# Patient Record
Sex: FEMALE | Race: OTHER | NOT HISPANIC OR LATINO
[De-identification: names, ages, dates, MRNs, and addresses within clinical notes are randomized per-mention and may not be internally consistent; named-entity substitution may affect disease eponyms.]

---

## 2021-07-09 PROBLEM — Z00.00 ENCOUNTER FOR PREVENTIVE HEALTH EXAMINATION: Status: ACTIVE | Noted: 2021-07-09

## 2021-07-12 ENCOUNTER — APPOINTMENT (OUTPATIENT)
Dept: ORTHOPEDIC SURGERY | Facility: CLINIC | Age: 61
End: 2021-07-12
Payer: COMMERCIAL

## 2021-07-12 VITALS — HEIGHT: 65 IN | BODY MASS INDEX: 30.16 KG/M2 | WEIGHT: 181 LBS

## 2021-07-12 PROCEDURE — 99204 OFFICE O/P NEW MOD 45 MIN: CPT

## 2021-07-12 PROCEDURE — 73030 X-RAY EXAM OF SHOULDER: CPT | Mod: RT

## 2021-07-12 PROCEDURE — 73562 X-RAY EXAM OF KNEE 3: CPT | Mod: 50

## 2021-07-12 NOTE — HISTORY OF PRESENT ILLNESS
[de-identified] : Location: Right shoulder\par Duration: 3 months\par Context: atraumatic\par Quality: achy, weakness\par Aggravating factors: ROM\par Associated symptoms: N/A\par Conservative treatment: rest, meloxicam\par Prior studies: N/A\par Also complaining of 2-3 years of bilateral knee pain, swelling, and stiffness that is made worse with walking and stairs

## 2021-07-12 NOTE — DISCUSSION/SUMMARY
[de-identified] : Treatment options have been discussed. At this point we'll begin some physical therapy. It sounds like she has some labile hypertension avoid anti-inflammatories. We discussed injection. Discussed surgery. Followup will be in 6 weeks. 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 Followup in 6 weeks.

## 2021-07-12 NOTE — PHYSICAL EXAM
[de-identified] : Right shoulder shows no warmth or swelling. There is tenderness over the anterolateral aspect of the acromion. There is a positive Neer impingement sign. Has a positive Bucio test. No rotator cuff weakness neurovascular exam is normal range of motion remains normal.\par \par Bilateral knee shows tenderness medially or crepitus on range of motion. There is a negative Evelin test bilaterally. No evidence of instability with either knee. Neurovascular exam is normal. [de-identified] : Right shoulder x-ray shows no evidence of acute fracture dislocation. There is some spurring underneath the acromion as well as the greater tuberosity\par \par Bilateral knee x-ray shows evidence of moderate degenerative arthritis.

## 2021-08-26 ENCOUNTER — APPOINTMENT (OUTPATIENT)
Dept: ORTHOPEDIC SURGERY | Facility: CLINIC | Age: 61
End: 2021-08-26
Payer: COMMERCIAL

## 2021-08-26 PROCEDURE — 99212 OFFICE O/P EST SF 10 MIN: CPT

## 2021-08-26 NOTE — DISCUSSION/SUMMARY
[de-identified] : At this time the patient is doing well. She is exercising on her own finishing up the physical therapy. If the symptoms recur she'll let me know followup will be as needed.

## 2021-08-26 NOTE — PHYSICAL EXAM
[de-identified] : Right shoulder shows no warmth or swelling. There is no tenderness over the anterolateral aspect of the acromion. There is a negative Neer impingement sign. Has a negatiive Bucio test. No rotator cuff weakness neurovascular exam is normal range of motion remains normal.\par \par Bilateral knee shows no tenderness medially or crepitus on range of motion. There is a negative Evelin test bilaterally. No evidence of instability with either knee. Neurovascular exam is normal.

## 2021-08-26 NOTE — HISTORY OF PRESENT ILLNESS
[de-identified] : Patient is following up on shoulder and knee pain. Patient states she has been attending physical therapy for the shoulder which has been\par going ok at the moment. She also stated she has gone back to work which has aggravated the pain further.Going up and down stairs aggravate knee pain.

## 2021-08-26 NOTE — REVIEW OF SYSTEMS
[Negative] : Heme/Lymph [Arthralgia] : arthralgia [Joint Pain] : no joint pain [Joint Stiffness] : no joint stiffness [Joint Swelling] : no joint swelling

## 2022-08-10 ENCOUNTER — APPOINTMENT (OUTPATIENT)
Dept: ORTHOPEDIC SURGERY | Facility: CLINIC | Age: 62
End: 2022-08-10

## 2022-08-10 DIAGNOSIS — M75.52 BURSITIS OF LEFT SHOULDER: ICD-10-CM

## 2022-08-10 DIAGNOSIS — M75.51 BURSITIS OF RIGHT SHOULDER: ICD-10-CM

## 2022-08-10 DIAGNOSIS — M17.0 BILATERAL PRIMARY OSTEOARTHRITIS OF KNEE: ICD-10-CM

## 2022-08-10 PROCEDURE — 99213 OFFICE O/P EST LOW 20 MIN: CPT

## 2022-08-10 NOTE — PHYSICAL EXAM
[de-identified] : Bilateral shoulder exam shows full range of motion positive impingement sign no weakness neurovascular exam is normal\par \par Bilateral knee exam shows crepitus on range of motion of both knees with joint line tenderness and patellofemoral tenderness no instability.

## 2022-08-10 NOTE — DISCUSSION/SUMMARY
[de-identified] : She is strongly thinking about knee replacement and have her come see Dr. Irving. With regard to her right shoulder she wants to hold off any treatment for now. She is going on disability in view of absence he may be some paperwork follow up will be as needed

## 2022-08-10 NOTE — HISTORY OF PRESENT ILLNESS
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

## 2022-08-20 ENCOUNTER — TRANSCRIPTION ENCOUNTER (OUTPATIENT)
Age: 62
End: 2022-08-20

## 2022-09-16 ENCOUNTER — APPOINTMENT (OUTPATIENT)
Dept: ORTHOPEDIC SURGERY | Facility: CLINIC | Age: 62
End: 2022-09-16

## 2022-09-16 VITALS — WEIGHT: 181 LBS | HEIGHT: 65 IN | BODY MASS INDEX: 30.16 KG/M2

## 2022-09-16 PROCEDURE — 99214 OFFICE O/P EST MOD 30 MIN: CPT | Mod: 25

## 2022-09-16 PROCEDURE — 73030 X-RAY EXAM OF SHOULDER: CPT | Mod: RT

## 2022-09-16 PROCEDURE — 73562 X-RAY EXAM OF KNEE 3: CPT | Mod: 50

## 2022-09-16 PROCEDURE — 20611 DRAIN/INJ JOINT/BURSA W/US: CPT | Mod: RT

## 2022-10-03 ENCOUNTER — APPOINTMENT (OUTPATIENT)
Dept: ORTHOPEDIC SURGERY | Facility: CLINIC | Age: 62
End: 2022-10-03

## 2022-10-03 PROCEDURE — 99214 OFFICE O/P EST MOD 30 MIN: CPT

## 2022-10-03 RX ORDER — BUDESONIDE AND FORMOTEROL FUMARATE DIHYDRATE 160; 4.5 UG/1; UG/1
160-4.5 AEROSOL RESPIRATORY (INHALATION)
Qty: 10 | Refills: 0 | Status: ACTIVE | COMMUNITY
Start: 2022-08-11

## 2022-10-03 NOTE — REASON FOR VISIT
[Follow-Up Visit] : a follow-up visit for [Knee Pain] : knee pain [Other: ____] : [unfilled] [FreeTextEntry2] : F/u for Rt shoulder pain and Aurelio knee pain. Needs paperwork filled out.

## 2022-10-03 NOTE — DISCUSSION/SUMMARY
[de-identified] : We talked again at length utilizing the radiographs about the patient's underlying etiology of her bilateral knee pain.  Her son is in attendance asked appropriate questions.  We talked about the potential need to consider knee replacement surgery if she does not see sustained symptomatic improvement with patient will consider this option follow-up with me in 6 weeks.\par \par Patient's current medical condition of severe osteoarthritis of both knees should be considered permanent form of disability.  She has complete loss of cartilage finds activities such as stair climbing and walking quite painful and challenging.  Gait.  Patient also with a degenerative arthritis that is generally progressive and that she may notice increasing symptoms as time progresses.\par \par Patient will follow-up in 6 weeks.

## 2022-10-03 NOTE — HISTORY OF PRESENT ILLNESS
[de-identified] : Patient returns today she was seen in September 2022.  Recently.  That point she was given cortisone injection to his shoulder.  She states her right shoulder feels significantly\par Luminal examination with findings of discomfort pleased with her improvement.  She also was seen because of bilateral knee pain patient had a diagnosis established with patellofemoral arthritis of both knees was marked for arthritis.  We talked on the last visit potential knee replacement surgery patient is here to discuss that as well as other potential options

## 2022-10-03 NOTE — PHYSICAL EXAM
[de-identified] : \par \par Right knee exam definite medial joint line tenderness range of motion 0-120with AP stress varus valgus stress soft tissue swelling is noted as well as warmth no obvious quad tone is good.\par \par Left knee has similar findings in his range of motion 0 to 125 degrees the knee is stable to AP valgus stress in both full extension and 90 degrees

## 2022-10-21 NOTE — REASON FOR VISIT
[Initial Visit] : an initial visit for [Knee Pain] : knee pain [FreeTextEntry2] : KAYCEE knee pain. Pt states she is in a lot of pain. Pt has difficulty climbing steps. Pt also states she has pain in the Rt shoulder. 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

## 2022-10-21 NOTE — HISTORY OF PRESENT ILLNESS
[de-identified] : First-time visit for this patient presenting with right shoulder pain which is fairly chronic.  She also is complaining of bilateral knee.  Patient has a known diagnosis of severe severe bilateral osteoarthritis of the knees.  Treated in the past by other physicians with injections medications.  She has a new complaint of 3-month history of right shoulder discomfort she has difficulty with overhead activities and internal rotation maneuvers such as putting on a jacket or a coat.\par \par As far as the knee is concerned patient with difficulty with daily activities such as longstanding stair climbing getting out of a seated position pain is chronic but seems to be worsening

## 2022-10-21 NOTE — PROCEDURE
[de-identified] : Patient was given a cortisone injection (Lidocaine 1% 4 cc + 10 mg of Kenalog) in the subacromial space of the right shoulder.. Injection is performed under sterile conditions with ultrasound guidance. Patient tolerated procedure well. If patient has a history of insulin- dependant diabetes they were informed of possible increase of blood glucose levels and instructed to adjust insulin accordingly.\par \par Manufacture [employer]\par Drug name  LIDOCAINE\par NDC #  8406-2096-87\par Lot #  [id number]\par Expiration date   05/2023\par \par Manufacture  [employer]\par Drug name  KENALOG\par NDC #  5161-5223-15\par Lot #  [id number]\par Expiration date  OCT 2023\par \par \par

## 2022-10-21 NOTE — DISCUSSION/SUMMARY
[Surgical risks reviewed] : Surgical risks reviewed [de-identified] : Talked at length about patient's underlying\par \par As far as his right shoulder is concerned patient has a tentative is rotator cuff tendinopathy no radiographic findings to suggest pregnancy of the shoulder.  Patient given a sterile subacromial injection today to help reduce her discomfort.  This was done via posterior approach\par \par Patient I also had a long discussion about her bilateral knee pain she is more symptomatic left.  Due to the advanced findings of the radiographs as well as findings suggesting consider knee replacement surgery reasonable risk and benefit discussed in detail as well as typical convalescence expectations and longevity of the implant.  Patient will consider this option follow-up in 3 weeks time.  At that point we can reassess her shoulder.

## 2022-10-21 NOTE — PHYSICAL EXAM
[de-identified] : Right shoulder positive terminal impingement pain with good cuff strength and tenderness with resisted forward elevation and abduction.  She is neurovascular intact distally\par \par Right knee exam definite medial joint line tenderness range of motion 0-120with AP stress varus valgus stress soft tissue swelling is noted as well as warmth no obvious quad tone is good.\par \par Left knee has similar findings in his range of motion 0 to 125 degrees the knee is stable to AP valgus stress in both full extension and 90 degrees [de-identified] : Right shoulder radiographs were ordered today.  AP and lateral were obtained showing well-preserved joint spaces no evidence of arthritis recent trauma or injury knee x-rays were ordered today.  AP standing\par And sunrise views were obtained showing severe tricompartmental arthritis in both knees with complete lateral as noted on sunrise view in the medial compartment as well as severe patellofemoral arthritis in the lateral patella facet on both knees on sunrise view

## 2023-02-13 ENCOUNTER — APPOINTMENT (OUTPATIENT)
Dept: ORTHOPEDIC SURGERY | Facility: CLINIC | Age: 63
End: 2023-02-13
Payer: COMMERCIAL

## 2023-02-13 DIAGNOSIS — M17.12 UNILATERAL PRIMARY OSTEOARTHRITIS, LEFT KNEE: ICD-10-CM

## 2023-02-13 PROCEDURE — 99215 OFFICE O/P EST HI 40 MIN: CPT

## 2023-02-13 PROCEDURE — 73562 X-RAY EXAM OF KNEE 3: CPT | Mod: 50

## 2023-02-13 NOTE — DISCUSSION/SUMMARY
[de-identified] : Patient I discussed at length the underlying etiology of her left knee pain.  She has radiographically confirmed diagnosis of severe osteoarthritis of the left knee.  We talked about her options including the need to consider knee replacement surgery due to the worsening nature of her symptoms and the advanced radiographic findings.\par \par We talked length about the reasonable risk benefits of the knee replacement surgery including potential complications.  We talked at length about typical convalescence expectations, our surgical approach, our implant selection criteria and we also talked at length about the typical reasonable expectations for implant longevity.\par \par Surgical risks reviewed. The reasonable risks and benefits of knee replacement surgery discussed in detail with the patient. Patient asked appropriate questions which were answered to their satisfaction. We talked about potential complications including complications they may require revision surgery such as component loosening failure migration wear and also potential complications of infection and stiffness.\par \par Patient will consider this option if she elects to move forward we will try to accommodate her soon as possible pending medical clearance.\par \par Today's consultation lasted 45 minutes.

## 2023-02-13 NOTE — HISTORY OF PRESENT ILLNESS
[de-identified] : Patient returns today she is here to consider surgical options for her worsening chronic left knee pain.  Patient has established diagnosis of severe osteoarthritis of the left knee.  She states she has difficulty with stair climbing it seems to be worsening over time she also has difficulty with prolonged standing.  Patient also has difficulty with getting in and out of seated position.  The pain is well localized about the anterior aspect the medial aspect of the left knee.

## 2023-02-13 NOTE — REASON FOR VISIT
[Follow-Up Visit] : a follow-up visit for [Knee Pain] : knee pain [FreeTextEntry2] : Lt knee pain. Pt wants to talk about possible knee surgery.

## 2023-02-13 NOTE — PHYSICAL EXAM
[de-identified] : Left knee has tenderness palpation at the medial joint line she also has tenderness patellofemoral compression.  Range of motion 0 to 125 degrees knee stable to stress varus valgus stress in both full extension and 90 degrees of flexion.  Quadriceps tone is good extensor mechanism is intact.  There is soft tissue swelling and warmth no effusion noted. [de-identified] : Knee radiographs were ordered today.  AP standing individual lateral sunrise views were obtained showing severe narrowing of the patellofemoral as well as medial compartment on the sunrise view as well as standing AP projection of the left knee.

## 2023-03-22 ENCOUNTER — NON-APPOINTMENT (OUTPATIENT)
Age: 63
End: 2023-03-22

## 2023-04-15 ENCOUNTER — LABORATORY RESULT (OUTPATIENT)
Age: 63
End: 2023-04-15

## 2023-04-18 ENCOUNTER — TRANSCRIPTION ENCOUNTER (OUTPATIENT)
Age: 63
End: 2023-04-18

## 2023-04-18 VITALS
HEART RATE: 90 BPM | WEIGHT: 202.83 LBS | HEIGHT: 65 IN | DIASTOLIC BLOOD PRESSURE: 87 MMHG | TEMPERATURE: 98 F | RESPIRATION RATE: 16 BRPM | OXYGEN SATURATION: 99 % | SYSTOLIC BLOOD PRESSURE: 149 MMHG

## 2023-04-18 RX ORDER — POVIDONE-IODINE 5 %
1 AEROSOL (ML) TOPICAL ONCE
Refills: 0 | Status: COMPLETED | OUTPATIENT
Start: 2023-04-19 | End: 2023-04-19

## 2023-04-18 RX ORDER — CHLORHEXIDINE GLUCONATE 213 G/1000ML
1 SOLUTION TOPICAL EVERY 12 HOURS
Refills: 0 | Status: COMPLETED | OUTPATIENT
Start: 2023-04-19 | End: 2023-04-20

## 2023-04-18 NOTE — H&P ADULT - PROBLEM SELECTOR PLAN 1
Admit to Orthopaedic Service.  Presents today for elective left TKR   Pt medically stable and cleared for procedure today by Dr. Meadows

## 2023-04-18 NOTE — ASU PREOP CHECKLIST - NS PREOP CHK MONITOR ANESTHESIA CONSENT
done Glycopyrrolate Pregnancy And Lactation Text: This medication is Pregnancy Category B and is considered safe during pregnancy. It is unknown if it is excreted breast milk.

## 2023-04-18 NOTE — H&P ADULT - NSHPLABSRESULTS_GEN_ALL_CORE
Preop CBC, BMP,  UA (trace leuks, + bacteria) - WNL per medical clearance   Cr 1.02   [ ] EKG  [ ] Coags    DOS Preop CBC, BMP,  UA (trace leuks, + bacteria) - WNL per medical clearance   Cr 1.02   Coags DOS- within normal limits   3M DOS

## 2023-04-18 NOTE — H&P ADULT - HISTORY OF PRESENT ILLNESS
63F c/o left knee pain x     Present for elective left total knee replacement  63F c/o left knee pain x greater than five years. Denies known injury. States knee pain developed gradually and has progressed. Denies previous surgeries. Notes failure of conservative management for left knee pain including oral analgesics and activity modification. Denies use of a cane/walker. Takes meloxicam without significant relief. Denies h/o blood clots, use of anticoagulants. Denies recent hospitalization/abx use.   Present for elective left total knee replacement with Dr. Brown

## 2023-04-18 NOTE — H&P ADULT - NSHPPHYSICALEXAM_GEN_ALL_CORE
MSK: + decreased ROM 2/2 pain, left knee       Remainder of exam per medical clearance note Gen: NAD  MSK: decreased ROM 2/2 pain at the left total knee   Motor: quad/TA/GS/EHL/FHl 5/5 bilateral lower extremities  Sensation: intact to light touch throughout bilateral lower extremities  Pulses: 2+ DP pulses bilaterally, extremities warm and well perfused, capillary refill brisk     Remainder of exam per medical clearance note

## 2023-04-18 NOTE — ASU PATIENT PROFILE, ADULT - HISTORY OF COVID-19 VACCINATION
Left detailed message that medication has been changed  Advised to call for any questions/concerns   Yes

## 2023-04-18 NOTE — H&P ADULT - NSICDXPASTMEDICALHX_GEN_ALL_CORE_FT
PAST MEDICAL HISTORY:  Asthma     HLD (hyperlipidemia)     HTN (hypertension)     Osteoarthritis of left knee

## 2023-04-19 ENCOUNTER — INPATIENT (INPATIENT)
Facility: HOSPITAL | Age: 63
LOS: 1 days | Discharge: HOME HEALTH | DRG: 470 | End: 2023-04-21
Attending: SPECIALIST | Admitting: SPECIALIST
Payer: COMMERCIAL

## 2023-04-19 ENCOUNTER — APPOINTMENT (OUTPATIENT)
Dept: ORTHOPEDIC SURGERY | Facility: HOSPITAL | Age: 63
End: 2023-04-19
Payer: COMMERCIAL

## 2023-04-19 DIAGNOSIS — I10 ESSENTIAL (PRIMARY) HYPERTENSION: ICD-10-CM

## 2023-04-19 DIAGNOSIS — Z98.890 OTHER SPECIFIED POSTPROCEDURAL STATES: Chronic | ICD-10-CM

## 2023-04-19 DIAGNOSIS — M17.12 UNILATERAL PRIMARY OSTEOARTHRITIS, LEFT KNEE: ICD-10-CM

## 2023-04-19 DIAGNOSIS — J45.909 UNSPECIFIED ASTHMA, UNCOMPLICATED: ICD-10-CM

## 2023-04-19 DIAGNOSIS — E78.5 HYPERLIPIDEMIA, UNSPECIFIED: ICD-10-CM

## 2023-04-19 DIAGNOSIS — Z98.891 HISTORY OF UTERINE SCAR FROM PREVIOUS SURGERY: Chronic | ICD-10-CM

## 2023-04-19 LAB
APTT BLD: 30.3 SEC — SIGNIFICANT CHANGE UP (ref 27.5–35.5)
INR BLD: 1.08 — SIGNIFICANT CHANGE UP (ref 0.88–1.16)
PROTHROM AB SERPL-ACNC: 12.9 SEC — SIGNIFICANT CHANGE UP (ref 10.5–13.4)

## 2023-04-19 PROCEDURE — 27447 TOTAL KNEE ARTHROPLASTY: CPT | Mod: AS,LT

## 2023-04-19 PROCEDURE — 27447 TOTAL KNEE ARTHROPLASTY: CPT | Mod: LT

## 2023-04-19 PROCEDURE — 73560 X-RAY EXAM OF KNEE 1 OR 2: CPT | Mod: 26,LT

## 2023-04-19 DEVICE — CELLERATE SURGICAL POWDER RX 5GM: Type: IMPLANTABLE DEVICE | Status: FUNCTIONAL

## 2023-04-19 DEVICE — BASEPLATE TIB JRNY NP SZ 2 LT: Type: IMPLANTABLE DEVICE | Status: FUNCTIONAL

## 2023-04-19 DEVICE — PIN RIMMED SPEED 45MM: Type: IMPLANTABLE DEVICE | Status: FUNCTIONAL

## 2023-04-19 DEVICE — FEM COMP JRNY II BCS BICRUCIATE LT SZ 4: Type: IMPLANTABLE DEVICE | Status: FUNCTIONAL

## 2023-04-19 DEVICE — PATELLA JOURNEY 29MM 1.5 MM: Type: IMPLANTABLE DEVICE | Status: FUNCTIONAL

## 2023-04-19 DEVICE — PIN TROCAR GEN 1/8 X 3IN: Type: IMPLANTABLE DEVICE | Status: FUNCTIONAL

## 2023-04-19 DEVICE — IMPLANTABLE DEVICE: Type: IMPLANTABLE DEVICE | Status: FUNCTIONAL

## 2023-04-19 RX ORDER — SENNA PLUS 8.6 MG/1
2 TABLET ORAL AT BEDTIME
Refills: 0 | Status: DISCONTINUED | OUTPATIENT
Start: 2023-04-19 | End: 2023-04-21

## 2023-04-19 RX ORDER — CELECOXIB 200 MG/1
200 CAPSULE ORAL EVERY 12 HOURS
Refills: 0 | Status: DISCONTINUED | OUTPATIENT
Start: 2023-04-20 | End: 2023-04-21

## 2023-04-19 RX ORDER — ACETAMINOPHEN 500 MG
650 TABLET ORAL EVERY 6 HOURS
Refills: 0 | Status: DISCONTINUED | OUTPATIENT
Start: 2023-04-19 | End: 2023-04-21

## 2023-04-19 RX ORDER — ALBUTEROL 90 UG/1
2 AEROSOL, METERED ORAL
Refills: 0 | DISCHARGE

## 2023-04-19 RX ORDER — POLYETHYLENE GLYCOL 3350 17 G/17G
17 POWDER, FOR SOLUTION ORAL AT BEDTIME
Refills: 0 | Status: DISCONTINUED | OUTPATIENT
Start: 2023-04-19 | End: 2023-04-21

## 2023-04-19 RX ORDER — METOPROLOL TARTRATE 50 MG
1 TABLET ORAL
Refills: 0 | DISCHARGE

## 2023-04-19 RX ORDER — CEFAZOLIN SODIUM 1 G
2000 VIAL (EA) INJECTION EVERY 8 HOURS
Refills: 0 | Status: COMPLETED | OUTPATIENT
Start: 2023-04-19 | End: 2023-04-20

## 2023-04-19 RX ORDER — ATORVASTATIN CALCIUM 80 MG/1
1 TABLET, FILM COATED ORAL
Refills: 0 | DISCHARGE

## 2023-04-19 RX ORDER — ASPIRIN/CALCIUM CARB/MAGNESIUM 324 MG
325 TABLET ORAL DAILY
Refills: 0 | Status: DISCONTINUED | OUTPATIENT
Start: 2023-04-19 | End: 2023-04-21

## 2023-04-19 RX ORDER — CELECOXIB 200 MG/1
400 CAPSULE ORAL ONCE
Refills: 0 | Status: COMPLETED | OUTPATIENT
Start: 2023-04-19 | End: 2023-04-19

## 2023-04-19 RX ORDER — AMLODIPINE BESYLATE 2.5 MG/1
1 TABLET ORAL
Refills: 0 | DISCHARGE

## 2023-04-19 RX ORDER — SODIUM CHLORIDE 9 MG/ML
1000 INJECTION, SOLUTION INTRAVENOUS
Refills: 0 | Status: DISCONTINUED | OUTPATIENT
Start: 2023-04-20 | End: 2023-04-21

## 2023-04-19 RX ORDER — ONDANSETRON 8 MG/1
4 TABLET, FILM COATED ORAL EVERY 6 HOURS
Refills: 0 | Status: DISCONTINUED | OUTPATIENT
Start: 2023-04-19 | End: 2023-04-21

## 2023-04-19 RX ORDER — MAGNESIUM HYDROXIDE 400 MG/1
30 TABLET, CHEWABLE ORAL DAILY
Refills: 0 | Status: DISCONTINUED | OUTPATIENT
Start: 2023-04-19 | End: 2023-04-21

## 2023-04-19 RX ORDER — HYDROMORPHONE HYDROCHLORIDE 2 MG/ML
0.5 INJECTION INTRAMUSCULAR; INTRAVENOUS; SUBCUTANEOUS ONCE
Refills: 0 | Status: COMPLETED | OUTPATIENT
Start: 2023-04-19

## 2023-04-19 RX ORDER — OXYCODONE HYDROCHLORIDE 5 MG/1
5 TABLET ORAL
Refills: 0 | Status: DISCONTINUED | OUTPATIENT
Start: 2023-04-19 | End: 2023-04-20

## 2023-04-19 RX ORDER — HYDROMORPHONE HYDROCHLORIDE 2 MG/ML
0.5 INJECTION INTRAMUSCULAR; INTRAVENOUS; SUBCUTANEOUS ONCE
Refills: 0 | Status: DISCONTINUED | OUTPATIENT
Start: 2023-04-19 | End: 2023-04-20

## 2023-04-19 RX ORDER — OXYCODONE HYDROCHLORIDE 5 MG/1
20 TABLET ORAL ONCE
Refills: 0 | Status: DISCONTINUED | OUTPATIENT
Start: 2023-04-19 | End: 2023-04-19

## 2023-04-19 RX ADMIN — ONDANSETRON 4 MILLIGRAM(S): 8 TABLET, FILM COATED ORAL at 16:45

## 2023-04-19 RX ADMIN — OXYCODONE HYDROCHLORIDE 20 MILLIGRAM(S): 5 TABLET ORAL at 09:48

## 2023-04-19 RX ADMIN — CHLORHEXIDINE GLUCONATE 1 APPLICATION(S): 213 SOLUTION TOPICAL at 08:43

## 2023-04-19 RX ADMIN — Medication 325 MILLIGRAM(S): at 21:43

## 2023-04-19 RX ADMIN — Medication 100 MILLIGRAM(S): at 18:32

## 2023-04-19 RX ADMIN — Medication 1 APPLICATION(S): at 08:43

## 2023-04-19 RX ADMIN — CELECOXIB 400 MILLIGRAM(S): 200 CAPSULE ORAL at 09:48

## 2023-04-20 LAB
ANION GAP SERPL CALC-SCNC: 12 MMOL/L — SIGNIFICANT CHANGE UP (ref 5–17)
BUN SERPL-MCNC: 11 MG/DL — SIGNIFICANT CHANGE UP (ref 7–23)
CALCIUM SERPL-MCNC: 9.4 MG/DL — SIGNIFICANT CHANGE UP (ref 8.4–10.5)
CHLORIDE SERPL-SCNC: 102 MMOL/L — SIGNIFICANT CHANGE UP (ref 96–108)
CO2 SERPL-SCNC: 23 MMOL/L — SIGNIFICANT CHANGE UP (ref 22–31)
CREAT SERPL-MCNC: 0.79 MG/DL — SIGNIFICANT CHANGE UP (ref 0.5–1.3)
EGFR: 83 ML/MIN/1.73M2 — SIGNIFICANT CHANGE UP
GLUCOSE SERPL-MCNC: 146 MG/DL — HIGH (ref 70–99)
HCT VFR BLD CALC: 36.7 % — SIGNIFICANT CHANGE UP (ref 34.5–45)
HCV AB S/CO SERPL IA: 0.11 S/CO — SIGNIFICANT CHANGE UP (ref 0–0.99)
HCV AB SERPL-IMP: SIGNIFICANT CHANGE UP
HGB BLD-MCNC: 11.4 G/DL — LOW (ref 11.5–15.5)
MCHC RBC-ENTMCNC: 24.5 PG — LOW (ref 27–34)
MCHC RBC-ENTMCNC: 31.1 GM/DL — LOW (ref 32–36)
MCV RBC AUTO: 78.8 FL — LOW (ref 80–100)
NRBC # BLD: 0 /100 WBCS — SIGNIFICANT CHANGE UP (ref 0–0)
PLATELET # BLD AUTO: 295 K/UL — SIGNIFICANT CHANGE UP (ref 150–400)
POTASSIUM SERPL-MCNC: 4.2 MMOL/L — SIGNIFICANT CHANGE UP (ref 3.5–5.3)
POTASSIUM SERPL-SCNC: 4.2 MMOL/L — SIGNIFICANT CHANGE UP (ref 3.5–5.3)
RBC # BLD: 4.66 M/UL — SIGNIFICANT CHANGE UP (ref 3.8–5.2)
RBC # FLD: 17.9 % — HIGH (ref 10.3–14.5)
SODIUM SERPL-SCNC: 137 MMOL/L — SIGNIFICANT CHANGE UP (ref 135–145)
WBC # BLD: 10.77 K/UL — HIGH (ref 3.8–10.5)
WBC # FLD AUTO: 10.77 K/UL — HIGH (ref 3.8–10.5)

## 2023-04-20 RX ORDER — VALSARTAN 80 MG/1
320 TABLET ORAL DAILY
Refills: 0 | Status: DISCONTINUED | OUTPATIENT
Start: 2023-04-20 | End: 2023-04-21

## 2023-04-20 RX ORDER — LANOLIN ALCOHOL/MO/W.PET/CERES
5 CREAM (GRAM) TOPICAL AT BEDTIME
Refills: 0 | Status: DISCONTINUED | OUTPATIENT
Start: 2023-04-20 | End: 2023-04-21

## 2023-04-20 RX ORDER — BUDESONIDE AND FORMOTEROL FUMARATE DIHYDRATE 160; 4.5 UG/1; UG/1
2 AEROSOL RESPIRATORY (INHALATION)
Refills: 0 | Status: DISCONTINUED | OUTPATIENT
Start: 2023-04-20 | End: 2023-04-21

## 2023-04-20 RX ORDER — SODIUM CHLORIDE 9 MG/ML
1000 INJECTION INTRAMUSCULAR; INTRAVENOUS; SUBCUTANEOUS ONCE
Refills: 0 | Status: COMPLETED | OUTPATIENT
Start: 2023-04-20 | End: 2023-04-20

## 2023-04-20 RX ORDER — HYDROMORPHONE HYDROCHLORIDE 2 MG/ML
0.5 INJECTION INTRAMUSCULAR; INTRAVENOUS; SUBCUTANEOUS
Refills: 0 | Status: DISCONTINUED | OUTPATIENT
Start: 2023-04-20 | End: 2023-04-21

## 2023-04-20 RX ORDER — CALCIUM CARBONATE 500(1250)
1 TABLET ORAL THREE TIMES A DAY
Refills: 0 | Status: DISCONTINUED | OUTPATIENT
Start: 2023-04-20 | End: 2023-04-21

## 2023-04-20 RX ORDER — AMLODIPINE BESYLATE 2.5 MG/1
10 TABLET ORAL DAILY
Refills: 0 | Status: DISCONTINUED | OUTPATIENT
Start: 2023-04-20 | End: 2023-04-21

## 2023-04-20 RX ORDER — OXYCODONE HYDROCHLORIDE 5 MG/1
5 TABLET ORAL EVERY 4 HOURS
Refills: 0 | Status: DISCONTINUED | OUTPATIENT
Start: 2023-04-20 | End: 2023-04-21

## 2023-04-20 RX ORDER — METOPROLOL TARTRATE 50 MG
50 TABLET ORAL DAILY
Refills: 0 | Status: DISCONTINUED | OUTPATIENT
Start: 2023-04-20 | End: 2023-04-21

## 2023-04-20 RX ADMIN — SODIUM CHLORIDE 1000 MILLILITER(S): 9 INJECTION INTRAMUSCULAR; INTRAVENOUS; SUBCUTANEOUS at 11:13

## 2023-04-20 RX ADMIN — OXYCODONE HYDROCHLORIDE 5 MILLIGRAM(S): 5 TABLET ORAL at 20:45

## 2023-04-20 RX ADMIN — Medication 650 MILLIGRAM(S): at 11:19

## 2023-04-20 RX ADMIN — Medication 100 MILLIGRAM(S): at 02:02

## 2023-04-20 RX ADMIN — Medication 650 MILLIGRAM(S): at 05:16

## 2023-04-20 RX ADMIN — Medication 650 MILLIGRAM(S): at 06:16

## 2023-04-20 RX ADMIN — CELECOXIB 200 MILLIGRAM(S): 200 CAPSULE ORAL at 05:13

## 2023-04-20 RX ADMIN — CELECOXIB 200 MILLIGRAM(S): 200 CAPSULE ORAL at 17:43

## 2023-04-20 RX ADMIN — Medication 325 MILLIGRAM(S): at 16:10

## 2023-04-20 RX ADMIN — OXYCODONE HYDROCHLORIDE 5 MILLIGRAM(S): 5 TABLET ORAL at 16:09

## 2023-04-20 RX ADMIN — Medication 650 MILLIGRAM(S): at 17:43

## 2023-04-20 RX ADMIN — OXYCODONE HYDROCHLORIDE 5 MILLIGRAM(S): 5 TABLET ORAL at 17:09

## 2023-04-20 RX ADMIN — CELECOXIB 200 MILLIGRAM(S): 200 CAPSULE ORAL at 06:16

## 2023-04-20 RX ADMIN — Medication 1 TABLET(S): at 11:19

## 2023-04-20 NOTE — PHYSICAL THERAPY INITIAL EVALUATION ADULT - ACTIVE RANGE OF MOTION EXAMINATION, REHAB EVAL
except left knee flexion 0-45 degrees/no Active ROM deficits were identified/deficits as listed below

## 2023-04-20 NOTE — PHYSICAL THERAPY INITIAL EVALUATION ADULT - PERTINENT HX OF CURRENT PROBLEM, REHAB EVAL
63F c/o left knee pain x greater than five years. Denies known injury. States knee pain developed gradually and has progressed. Denies previous surgeries. Notes failure of conservative management for left knee pain including oral analgesics and activity modification

## 2023-04-20 NOTE — OCCUPATIONAL THERAPY INITIAL EVALUATION ADULT - PERTINENT HX OF CURRENT PROBLEM, REHAB EVAL
63F c/o left knee pain x greater than five years. Denies known injury. States knee pain developed gradually and has progressed. Denies previous surgeries. Notes failure of conservative management for left knee pain including oral analgesics and activity modification. Denies use of a cane/walker. Takes meloxicam without significant relief. Denies h/o blood clots, use of anticoagulants. Denies recent hospitalization/abx use.

## 2023-04-20 NOTE — OCCUPATIONAL THERAPY INITIAL EVALUATION ADULT - MODALITIES TREATMENT COMMENTS
Of note, pt. +orthostatic after activity and seated in chair, pt. returned to bed immediately HOB mainly flat, team aware

## 2023-04-20 NOTE — OCCUPATIONAL THERAPY INITIAL EVALUATION ADULT - ADDITIONAL COMMENTS
per pt. she lives alone in an elevator accessible building, no LUIS. She was I prior in  ADLs and functional mob without AD. She has a tub/shower combo and a normal toilet. She is R handed

## 2023-04-20 NOTE — OCCUPATIONAL THERAPY INITIAL EVALUATION ADULT - GENERAL OBSERVATIONS, REHAB EVAL
RN Crystal clearing pt. for OOB. Pt. received semi-supine in bed,+cryocuff,+heplock, + L knee bandage C/D/I, +b/l compression stockings in NAD, agreeable to therapy session. RN Crystal clearing pt. for OOB. Pt. received semi-supine in bed,+cryocuff, +JUNG, +heplock, + L knee bandage C/D/I, +b/l compression stockings in NAD, agreeable to therapy session.

## 2023-04-20 NOTE — OCCUPATIONAL THERAPY INITIAL EVALUATION ADULT - DIAGNOSIS, OT EVAL
Pt. is POD#1 s/p L TKR. Upon assessment, pt. demo slight impairments in balance, postural control, activity tolerance and BUE/BLE strength.

## 2023-04-21 ENCOUNTER — TRANSCRIPTION ENCOUNTER (OUTPATIENT)
Age: 63
End: 2023-04-21

## 2023-04-21 VITALS
DIASTOLIC BLOOD PRESSURE: 70 MMHG | RESPIRATION RATE: 16 BRPM | OXYGEN SATURATION: 97 % | SYSTOLIC BLOOD PRESSURE: 121 MMHG | HEART RATE: 79 BPM | TEMPERATURE: 98 F

## 2023-04-21 LAB
ANION GAP SERPL CALC-SCNC: 12 MMOL/L — SIGNIFICANT CHANGE UP (ref 5–17)
BUN SERPL-MCNC: 15 MG/DL — SIGNIFICANT CHANGE UP (ref 7–23)
CALCIUM SERPL-MCNC: 8.9 MG/DL — SIGNIFICANT CHANGE UP (ref 8.4–10.5)
CHLORIDE SERPL-SCNC: 105 MMOL/L — SIGNIFICANT CHANGE UP (ref 96–108)
CO2 SERPL-SCNC: 22 MMOL/L — SIGNIFICANT CHANGE UP (ref 22–31)
CREAT SERPL-MCNC: 0.91 MG/DL — SIGNIFICANT CHANGE UP (ref 0.5–1.3)
EGFR: 70 ML/MIN/1.73M2 — SIGNIFICANT CHANGE UP
GLUCOSE SERPL-MCNC: 109 MG/DL — HIGH (ref 70–99)
HCT VFR BLD CALC: 30.7 % — LOW (ref 34.5–45)
HGB BLD-MCNC: 9.7 G/DL — LOW (ref 11.5–15.5)
MCHC RBC-ENTMCNC: 24.9 PG — LOW (ref 27–34)
MCHC RBC-ENTMCNC: 31.6 GM/DL — LOW (ref 32–36)
MCV RBC AUTO: 78.9 FL — LOW (ref 80–100)
NRBC # BLD: 0 /100 WBCS — SIGNIFICANT CHANGE UP (ref 0–0)
PLATELET # BLD AUTO: 250 K/UL — SIGNIFICANT CHANGE UP (ref 150–400)
POTASSIUM SERPL-MCNC: 3.9 MMOL/L — SIGNIFICANT CHANGE UP (ref 3.5–5.3)
POTASSIUM SERPL-SCNC: 3.9 MMOL/L — SIGNIFICANT CHANGE UP (ref 3.5–5.3)
RBC # BLD: 3.89 M/UL — SIGNIFICANT CHANGE UP (ref 3.8–5.2)
RBC # FLD: 18.1 % — HIGH (ref 10.3–14.5)
SODIUM SERPL-SCNC: 139 MMOL/L — SIGNIFICANT CHANGE UP (ref 135–145)
WBC # BLD: 8.12 K/UL — SIGNIFICANT CHANGE UP (ref 3.8–10.5)
WBC # FLD AUTO: 8.12 K/UL — SIGNIFICANT CHANGE UP (ref 3.8–10.5)

## 2023-04-21 PROCEDURE — C1889: CPT

## 2023-04-21 PROCEDURE — C1713: CPT

## 2023-04-21 PROCEDURE — 97116 GAIT TRAINING THERAPY: CPT

## 2023-04-21 PROCEDURE — 85730 THROMBOPLASTIN TIME PARTIAL: CPT

## 2023-04-21 PROCEDURE — 97165 OT EVAL LOW COMPLEX 30 MIN: CPT

## 2023-04-21 PROCEDURE — 85027 COMPLETE CBC AUTOMATED: CPT

## 2023-04-21 PROCEDURE — 97161 PT EVAL LOW COMPLEX 20 MIN: CPT

## 2023-04-21 PROCEDURE — 86803 HEPATITIS C AB TEST: CPT

## 2023-04-21 PROCEDURE — 36415 COLL VENOUS BLD VENIPUNCTURE: CPT

## 2023-04-21 PROCEDURE — C1776: CPT

## 2023-04-21 PROCEDURE — 85610 PROTHROMBIN TIME: CPT

## 2023-04-21 PROCEDURE — 80048 BASIC METABOLIC PNL TOTAL CA: CPT

## 2023-04-21 PROCEDURE — 73560 X-RAY EXAM OF KNEE 1 OR 2: CPT

## 2023-04-21 RX ORDER — CELECOXIB 200 MG/1
1 CAPSULE ORAL
Qty: 14 | Refills: 0
Start: 2023-04-21 | End: 2023-04-27

## 2023-04-21 RX ORDER — ASPIRIN/CALCIUM CARB/MAGNESIUM 324 MG
1 TABLET ORAL
Qty: 10 | Refills: 0
Start: 2023-04-21 | End: 2023-04-30

## 2023-04-21 RX ORDER — OXYCODONE HYDROCHLORIDE 5 MG/1
1 TABLET ORAL
Qty: 28 | Refills: 0
Start: 2023-04-21 | End: 2023-04-27

## 2023-04-21 RX ORDER — SENNA PLUS 8.6 MG/1
2 TABLET ORAL
Qty: 0 | Refills: 0 | DISCHARGE
Start: 2023-04-21

## 2023-04-21 RX ORDER — MELOXICAM 15 MG/1
1 TABLET ORAL
Refills: 0 | DISCHARGE

## 2023-04-21 RX ORDER — BUDESONIDE AND FORMOTEROL FUMARATE DIHYDRATE 160; 4.5 UG/1; UG/1
0 AEROSOL RESPIRATORY (INHALATION)
Qty: 0 | Refills: 0 | DISCHARGE
Start: 2023-04-21

## 2023-04-21 RX ORDER — PANTOPRAZOLE SODIUM 20 MG/1
1 TABLET, DELAYED RELEASE ORAL
Qty: 10 | Refills: 0
Start: 2023-04-21 | End: 2023-04-30

## 2023-04-21 RX ORDER — BUDESONIDE AND FORMOTEROL FUMARATE DIHYDRATE 160; 4.5 UG/1; UG/1
2 AEROSOL RESPIRATORY (INHALATION)
Refills: 0 | DISCHARGE

## 2023-04-21 RX ORDER — ACETAMINOPHEN 500 MG
2 TABLET ORAL
Qty: 0 | Refills: 0 | DISCHARGE
Start: 2023-04-21

## 2023-04-21 RX ADMIN — Medication 650 MILLIGRAM(S): at 05:39

## 2023-04-21 RX ADMIN — Medication 650 MILLIGRAM(S): at 00:30

## 2023-04-21 RX ADMIN — CELECOXIB 200 MILLIGRAM(S): 200 CAPSULE ORAL at 17:14

## 2023-04-21 RX ADMIN — AMLODIPINE BESYLATE 10 MILLIGRAM(S): 2.5 TABLET ORAL at 05:39

## 2023-04-21 RX ADMIN — Medication 650 MILLIGRAM(S): at 17:14

## 2023-04-21 RX ADMIN — OXYCODONE HYDROCHLORIDE 5 MILLIGRAM(S): 5 TABLET ORAL at 11:53

## 2023-04-21 RX ADMIN — OXYCODONE HYDROCHLORIDE 5 MILLIGRAM(S): 5 TABLET ORAL at 12:53

## 2023-04-21 RX ADMIN — Medication 325 MILLIGRAM(S): at 15:35

## 2023-04-21 RX ADMIN — CELECOXIB 200 MILLIGRAM(S): 200 CAPSULE ORAL at 05:39

## 2023-04-21 RX ADMIN — Medication 650 MILLIGRAM(S): at 11:52

## 2023-04-21 RX ADMIN — Medication 1 TABLET(S): at 11:52

## 2023-04-21 RX ADMIN — Medication 50 MILLIGRAM(S): at 05:40

## 2023-04-21 NOTE — DISCHARGE NOTE PROVIDER - HOSPITAL COURSE
Admit to Orthopaedics on 4/19/23 for left total knee replacement   Perioperative Antibiotics  DVT prophylaxis  Physical Therapy  Pain Management

## 2023-04-21 NOTE — DISCHARGE NOTE PROVIDER - NSDCCPTREATMENT_GEN_ALL_CORE_FT
PRINCIPAL PROCEDURE  Procedure: Left total knee arthroplasty  Findings and Treatment:      PRINCIPAL PROCEDURE  Procedure: Left total knee arthroplasty  Findings and Treatment: osteoarthritis of the left knee

## 2023-04-21 NOTE — DISCHARGE NOTE NURSING/CASE MANAGEMENT/SOCIAL WORK - PATIENT PORTAL LINK FT
You can access the FollowMyHealth Patient Portal offered by [facility] by registering at the following website: [email or web address]. By joining [email or web address]’s FollowMyHealth portal, you will also be able to view your health information using other applications (apps) compatible with our system.

## 2023-04-21 NOTE — DISCHARGE NOTE PROVIDER - CARE PROVIDER_API CALL
Jorge Brown)  Orthopaedic Surgery  [address], [address] [place] [address]  Phone: [phone]  Fax: [phone]  Follow Up Time:

## 2023-04-21 NOTE — DISCHARGE NOTE PROVIDER - NSDCFUADDINST_GEN_ALL_CORE_FT
ACTIVITY:   - Weight bear as tolerated with assistive device. No strenuous activity, heavy lifting, driving or returning to work until cleared by MD.   - Apply a cold compress to the surgical site several times daily to reduce pain and swelling. For icing, twenty-minute sessions followed by an hour off is recommended. You should ice as frequently as possible. Ice should NEVER be placed directly on the skin. Wearing compression stockings during the first week after surgery can help reduce swelling in your knee, calf and foot, but is not required.      (KNEE REPLACEMENTS)   DO place a pillow under your heel when elevating the leg, to encourage full extension of the knee. Do NOT place a pillow behind your knee for comfort, as this can lead to permanent difficulty straightening your leg. It is normal to develop some swelling in the leg, ankle, and foot because of gravity.     (JUNG – incisional wound vac)   - You have an incisional wound vac dressing with tubing to attached canister/battery pack. You may shower but must keep battery pack dry at all times. The battery dies in 7 days then can remove dressing and leave open to air. Keep your incision clean and dry. Do not pick at your incision. Do not apply creams, ointments or oils to your incision until cleared by your surgeon. Do not soak your incision in sitting water (ie tubs, pools, lakes, etc.) until cleared by your surgeon. Do not scrub the incision – instead, allow soap and water to flow over the incision and then pat it dry with a clean towel.     MEDICATION/ANTICOAGULATION:   -You have been prescribed Aspirin as a preventative to help prevent postoperative blood clots. Please take this medication as prescribed.    - You have been prescribed medications for pain:     - Tylenol for mild to moderate pain. Do not exceed 3,000mg daily.     - For more severe pain, take Tylenol with the addition of narcotic pain medication. Take this medication as prescribed. This medication may cause drowsiness or dizziness. Do not operate machinery. This medication may cause constipation.   - For any additional medications, follow instructions on the bottle.    -Try to have regular bowel movements. Take stool softener or laxative if necessary. You may wish to take Miralax daily until you have regular bowel movements.    - If you have been prescribed Aspirin or an anti-inflammatory, please take prilosec (omeprazole) once a day, before breakfast, until no longer taking Aspirin or anti-inflammatory. This will help protect your stomach.   - If you have a pain management physician, please follow-up with them postoperatively.    - If you experience any negative side effects of your medications, please call your surgeon's office to discuss.      FOLLOW-UP:   - Call to schedule an appt with Dr. Brown for follow up.   - Please follow-up with your primary care physician or any other specialist you see postoperatively, if needed.    - Contact your doctor or go to the emergency room if you experience: fever greater than 101.5, chills, chest pain, difficulty breathing, redness or excessive drainage around the incision, other concerns.

## 2023-04-21 NOTE — PROGRESS NOTE ADULT - SUBJECTIVE AND OBJECTIVE BOX
Ortho Note    Subjective:  Pt reporting Right knee stiffness and soreness, pain managed with current pain medication regimen.  Denies CP, SOB, N/V, numbness/tingling   Reviewed plan of care with patient at bedside    Vital Signs Last 24 Hrs  T(C): 36.9 (04-20-23 @ 08:31), Max: 36.9 (04-20-23 @ 08:31)  T(F): 98.5 (04-20-23 @ 08:31), Max: 98.5 (04-20-23 @ 08:31)  HR: 93 (04-20-23 @ 08:31) (93 - 94)  BP: 134/86 (04-20-23 @ 08:31) (134/86 - 144/87)  BP(mean): 102 (04-20-23 @ 08:31) (102 - 102)  RR: 16 (04-20-23 @ 08:31) (16 - 20)  SpO2: 98% (04-20-23 @ 08:31) (98% - 99%)  AVSS    Objective:    Physical Exam:  General: Pt Alert and oriented, NAD  Right knee Agnes DSG C/D/I  Pulses: +2 pedal pulses, wwp toes  Sensation: silt intact bilateral lower extremities  Motor: EHL/FHL/TA/GS- 5/5 bilateral lower extremities        Plan of Care:  A/P: 63yFemale s/p R TKR 4-19  - afebrile  - Pain Control- celebrex 200mg PO Q12h, tylenol 650mg PO Q6h, Oxycodone 5mg PO Q4h prn severe pain, dilaudid 0.5mg Q2h IVP prn breakthrough pain,  - DVT ppx: aspirin 325mg PO Daily  - PT, WBS: WBAT  - ambulate with pt as tolerated  - bowel regimen, IS use, PPI  - DIspo- home pending pt clearance    Ortho Pager [phone]
Ortho Note    Subjective:  Pt reporting Right knee stiffness and soreness, pain managed with current pain medication regimen.  Denies CP, SOB, N/V, numbness/tingling   Reviewed plan of care with patient at bedside    Vital Signs Last 24 Hrs  T(C): 36.7 (04-21-23 @ 08:41), Max: 36.7 (04-21-23 @ 05:20)  T(F): 98 (04-21-23 @ 08:41), Max: 98.1 (04-21-23 @ 05:20)  HR: 79 (04-21-23 @ 08:41) (79 - 80)  BP: 121/70 (04-21-23 @ 08:41) (110/66 - 121/70)  BP(mean): 87 (04-21-23 @ 08:41) (81 - 87)  RR: 16 (04-21-23 @ 08:41) (16 - 18)  SpO2: 97% (04-21-23 @ 08:41) (97% - 97%)  AVSS    Objective:    Physical Exam:  General: Pt Alert and oriented, NAD  Right knee Agnes DSG C/D/I  Pulses: +2 pedal pulses, wwp toes  Sensation: silt intact bilateral lower extremities  Motor: EHL/FHL/TA/GS- 5/5 bilateral lower extremities        Plan of Care:  A/P: 63yFemale s/p R TKR 4-19  - afebrile  - Pain Control- celebrex 200mg PO Q12h, tylenol 650mg PO Q6h, Oxycodone 5mg PO Q4h prn severe pain, dilaudid 0.5mg Q2h IVP prn breakthrough pain,  - DVT ppx: aspirin 325mg PO Daily  - PT, WBS: WBAT  - ambulate with pt as tolerated  - bowel regimen, IS use, PPI  - DIspo- home pending pt clearance    Ortho Pager [phone]
Orthopaedic Surgery Post Operative Check    Procedure: left total knee replacement   Surgeon: Dr. Brown     Pt comfortable without complaints, pain controlled  Denies CP, SOB, N/V, numbness/tingling    Vital Signs Last 24 Hrs  T(C): 36.3 (04-19-23 @ 14:26), Max: 36.8 (04-19-23 @ 09:14)  T(F): 97.3 (04-19-23 @ 14:26), Max: 97.3 (04-19-23 @ 13:26)  HR: 68 (04-19-23 @ 14:26) (64 - 90)  BP: 133/73 (04-19-23 @ 14:26) (107/69 - 149/87)  BP(mean): 97 (04-19-23 @ 14:26) (83 - 97)  RR: 10 (04-19-23 @ 14:26) (10 - 17)  SpO2: 96% (04-19-23 @ 14:26) (94% - 99%)  AVSS    General: Pt Alert and oriented, NAD  DSG C/D/I left knee daquan   Pulses: DP pulse palpable left lower extremity   Sensation: intact to bilateral lower extremities   Motor: EHL/FHL/TA/GS 5/5 bilateral lower extremities     Post-op X-Ray: right knee prosthesis in place     A/P: 63yFemale POD#0 s/p  right total knee replacement   - Stable  - Pain Control  - DVT ppx: ASA  - Post op abx: ancef   - PT, WBS: wbat   - f/u AM labs     Ortho Pager [phone]

## 2023-04-21 NOTE — DISCHARGE NOTE PROVIDER - NSDCCPCAREPLAN_GEN_ALL_CORE_FT
PRINCIPAL DISCHARGE DIAGNOSIS  Diagnosis: Osteoarthritis of left knee  Assessment and Plan of Treatment:      PRINCIPAL DISCHARGE DIAGNOSIS  Diagnosis: Osteoarthritis of left knee  Assessment and Plan of Treatment: s/p left TKR

## 2023-04-21 NOTE — DISCHARGE NOTE PROVIDER - CARE PROVIDERS DIRECT ADDRESSES
[email or web address]

## 2023-04-21 NOTE — DISCHARGE NOTE PROVIDER - NSDCMRMEDTOKEN_GEN_ALL_CORE_FT
Albuterol (Eqv-ProAir HFA) 90 mcg/inh inhalation aerosol: 2 inhaled as needed for  bronchospasm  amLODIPine 10 mg oral tablet: 1 orally once a day  atorvastatin 20 mg oral tablet: 1 orally once a day  meloxicam 15 mg oral tablet: 1 orally once a day  metoprolol succinate 50 mg oral tablet, extended release: 1 orally once a day  Symbicort 160 mcg-4.5 mcg/inh inhalation aerosol: 2 inhaled as needed for  bronchospasm  valsartan-hydrochlorothiazide 320 mg-25 mg oral tablet: 1 orally once a day   acetaminophen 325 mg oral tablet: 2 tab(s) orally every 6 hours  Albuterol (Eqv-ProAir HFA) 90 mcg/inh inhalation aerosol: 2 inhaled as needed for  bronchospasm  amLODIPine 10 mg oral tablet: 1 orally once a day  aspirin 325 mg oral tablet: 1 tab(s) orally once a day  atorvastatin 20 mg oral tablet: 1 orally once a day  budesonide-formoterol 160 mcg-4.5 mcg/inh inhalation aerosol: inhaled once a day  celecoxib 200 mg oral capsule: 1 cap(s) orally every 12 hours  metoprolol succinate 50 mg oral tablet, extended release: 1 orally once a day  Multiple Vitamins oral tablet: 1 tab(s) orally once a day  oxyCODONE 5 mg oral tablet: 1 tab(s) orally every 6 hours as needed for Severe Pain (7 - 10) MDD: 4  pantoprazole 40 mg oral delayed release tablet: 1 tab(s) orally once a day  senna leaf extract oral tablet: 2 tab(s) orally once a day (at bedtime)  valsartan-hydrochlorothiazide 320 mg-25 mg oral tablet: 1 orally once a day

## 2023-04-26 DIAGNOSIS — M25.762 OSTEOPHYTE, LEFT KNEE: ICD-10-CM

## 2023-04-26 DIAGNOSIS — J45.909 UNSPECIFIED ASTHMA, UNCOMPLICATED: ICD-10-CM

## 2023-04-26 DIAGNOSIS — Z79.899 OTHER LONG TERM (CURRENT) DRUG THERAPY: ICD-10-CM

## 2023-04-26 DIAGNOSIS — G47.00 INSOMNIA, UNSPECIFIED: ICD-10-CM

## 2023-04-26 DIAGNOSIS — Z79.1 LONG TERM (CURRENT) USE OF NON-STEROIDAL ANTI-INFLAMMATORIES (NSAID): ICD-10-CM

## 2023-04-26 DIAGNOSIS — M17.12 UNILATERAL PRIMARY OSTEOARTHRITIS, LEFT KNEE: ICD-10-CM

## 2023-04-26 DIAGNOSIS — I10 ESSENTIAL (PRIMARY) HYPERTENSION: ICD-10-CM

## 2023-04-26 DIAGNOSIS — E78.5 HYPERLIPIDEMIA, UNSPECIFIED: ICD-10-CM

## 2023-04-26 DIAGNOSIS — Z88.1 ALLERGY STATUS TO OTHER ANTIBIOTIC AGENTS STATUS: ICD-10-CM

## 2023-05-04 PROBLEM — I10 ESSENTIAL (PRIMARY) HYPERTENSION: Chronic | Status: ACTIVE | Noted: 2023-04-18

## 2023-05-04 PROBLEM — E78.5 HYPERLIPIDEMIA, UNSPECIFIED: Chronic | Status: ACTIVE | Noted: 2023-04-18

## 2023-05-04 PROBLEM — J45.909 UNSPECIFIED ASTHMA, UNCOMPLICATED: Chronic | Status: ACTIVE | Noted: 2023-04-18

## 2023-05-04 PROBLEM — M17.12 UNILATERAL PRIMARY OSTEOARTHRITIS, LEFT KNEE: Chronic | Status: ACTIVE | Noted: 2023-04-18

## 2023-05-05 ENCOUNTER — APPOINTMENT (OUTPATIENT)
Dept: ORTHOPEDIC SURGERY | Facility: CLINIC | Age: 63
End: 2023-05-05
Payer: COMMERCIAL

## 2023-05-05 PROCEDURE — 99024 POSTOP FOLLOW-UP VISIT: CPT

## 2023-05-05 PROCEDURE — 73562 X-RAY EXAM OF KNEE 3: CPT | Mod: 50

## 2023-05-05 RX ORDER — ACETAMINOPHEN AND CODEINE PHOSPHATE 300; 30 MG/1; MG/1
300-30 TABLET ORAL DAILY
Qty: 20 | Refills: 0 | Status: ACTIVE | COMMUNITY
Start: 2023-05-05 | End: 1900-01-01

## 2023-05-05 NOTE — PHYSICAL EXAM
[de-identified] : Left knee exam the wound is well-healed appropriate swelling neurovascular intact distally range of motion is 0 to 105 degrees. [de-identified] : Knee radiographs were ordered today.  AP standing individual lateral sunrise views were obtained showing a well-positioned left [employer] journey type prosthesis.

## 2023-05-05 NOTE — HISTORY OF PRESENT ILLNESS
[de-identified] : Patient returns today she is status post 17 days knee replacement surgery is doing quite well patient has pain only at night at this point she would like to change her pain medication to something less strong.  She is continue with home physical therapy.

## 2023-05-05 NOTE — DISCUSSION/SUMMARY
[de-identified] : Patient be transition to outpatient physical therapy we will give her a limited number of Tylenol 3 tablets to subsequent for her oxycodone follow-up in 6 weeks time.

## 2023-05-09 ENCOUNTER — APPOINTMENT (OUTPATIENT)
Dept: ORTHOPEDIC SURGERY | Facility: CLINIC | Age: 63
End: 2023-05-09

## 2023-09-08 ENCOUNTER — APPOINTMENT (OUTPATIENT)
Dept: ORTHOPEDIC SURGERY | Facility: CLINIC | Age: 63
End: 2023-09-08
Payer: COMMERCIAL

## 2023-09-08 DIAGNOSIS — M25.562 PAIN IN LEFT KNEE: ICD-10-CM

## 2023-09-08 DIAGNOSIS — G89.29 PAIN IN LEFT KNEE: ICD-10-CM

## 2023-09-08 PROCEDURE — 99214 OFFICE O/P EST MOD 30 MIN: CPT

## 2023-09-08 RX ORDER — CELECOXIB 200 MG/1
200 CAPSULE ORAL
Qty: 24 | Refills: 1 | Status: ACTIVE | COMMUNITY
Start: 2023-09-08 | End: 1900-01-01

## 2023-09-08 NOTE — DISCUSSION/SUMMARY
[de-identified] : Patient I talked about the underlying etiology of her left knee pain.  I believe she is doing well in general however I believe she may have had some increased recent stress on the knee causing some inflammation about the knee.  At this point we will simply place her on Celebrex 200 mg p.o. twice daily for a 5-day course then to be taken thereafter as needed.  He was also encouraged to ice the knee twice a day for the next 5 to 6 days as well.  Patient will follow-up in 2 weeks if not thoroughly improved.  The reason risk and benefits of medication were discussed in detail including potential side effects.  Reviewed current medication profile appears to be no relative contraindication to its current use.  Today's consultation lasted 30 minutes

## 2023-09-08 NOTE — REASON FOR VISIT
[Follow-Up Visit] : a follow-up visit for [Knee Pain] : knee pain [FreeTextEntry2] : LEFT KNEE REPLACED 4/19. SHE IS STILL FEELING PAIN WHEN CLIMBING STAIRS.

## 2023-09-08 NOTE — PHYSICAL EXAM
[de-identified] : Left knee has some soft tissue swelling and warmth no effusion range of motion 0 to 125 degrees no crepitus.  The knee is stable extra stress valgus stress in both full extension and 9 degrees flexion.

## 2023-09-08 NOTE — HISTORY OF PRESENT ILLNESS
[de-identified] : Patient returns today she has some increased swelling of the left knee status post April of this year knee replacement surgery overall she is doing well just feels some slight increase in soreness going up and down steps no recent antecedent accident or injury history.

## 2025-01-29 NOTE — ASU PATIENT PROFILE, ADULT - TEACHING/LEARNING CULTURAL CONSIDERATIONS
Spoke with patient via phone regarding anticoagulation monitoring.   Last INR on 1/14 was 2.0.  Dose maintained.   Today's INR is 2.4 and is within goal range.    Current warfarin total weekly dose of 60 mg verified.  Informed the INR result is within therapeutic range and instructed to maintain current dose per protocol. Discussed dose and return date of 02/17/25 for next INR. See Anticoagulation flowsheet.    Dr Amin is in the office today supervising the treatment.    Instructed to contact the clinic with any unusual bleeding or bruising, any changes in medications, diet, health status, lifestyle, or any other changes, questions or concerns. Verbalized understanding of all discussed.      none

## 2025-02-07 NOTE — H&P ADULT - BIRTH SEX
[address]  [place] [address]  Tel.  [phone]  Fax. [phone] [address]  [place] [address]  Tel.  [phone]  Fax. [phone] [address] [place] [address]  Tel.  [phone]  Fax. [phone]     Sleep Apnea: After Your Visit  Your Care Instructions  Sleep apnea occurs when you frequently stop breathing for 10 seconds or longer during sleep. It can be mild to severe, based on the number of times per hour that you stop breathing or have slowed breathing. Blocked or narrowed airways in your nose, mouth, or throat can cause sleep apnea. Your airway can become blocked when your throat muscles and tongue relax during sleep.  Sleep apnea is common, occurring in 1 out of 20 individuals.  Individuals having any of the following characteristics should be evaluated and treated right away due to high risk and detrimental consequences from untreated sleep apnea:  Obesity  Congestive Heart failure  Atrial Fibrillation  Uncontrolled Hypertension  Type II Diabetes  Night-time Arrhythmias  Stroke  Pulmonary Hypertension  High-risk Driving Populations (pilots, truck drivers, etc.)  Patients Considering Weight-loss Surgery    How do you know you have sleep apnea?  You probably have sleep apnea if you answer 'yes' to 3 or more of the following questions:  S - Have you been told that you Snore?   T - Are you often Tired during the day?  O - Has anyone Observed you stop breathing while sleeping?  P- Do you have (or are being treated for) high blood Pressure?    B - Are you obese (Body Mass Index > 35)?  A - Is your Age 50 years old or older?  N - Is your Neck size greater than 16 inches?  G - Are you male Gender?  A sleep physician can prescribe a breathing device that prevents tissues in the throat from blocking your airway. Or your doctor may recommend using a dental device (oral breathing device) to help keep your airway open. In some cases, surgery may 
Female

## 2025-07-29 NOTE — PHYSICAL THERAPY INITIAL EVALUATION ADULT - LEVEL OF INDEPENDENCE: STAND/SIT, REHAB EVAL
[] Monitor ambulatory blood pressure weekly  [] Avoid high cholesterol and high salt foods, follow a DASH diet.  [] Recommend 30 minutes of physical activity 4-5 days a week.   Follow up in 9-12 months  
contact guard

## (undated) DEVICE — SAW BLADE STRYKER SAGITTAL DUAL CUT TEETH 35X64X.64MM

## (undated) DEVICE — SUT VICRYL 2-0 27" CT-1

## (undated) DEVICE — DRSG COBAN 6"

## (undated) DEVICE — DRSG PICO NPWT 4X12"

## (undated) DEVICE — SAW BLADE STRYKER SAGITTAL 25X86.5X1.32MM

## (undated) DEVICE — STRYKER 4-PORT MANIFOLD W/SPECIMEN COLLECTION

## (undated) DEVICE — WARMING BLANKET UPPER ADULT

## (undated) DEVICE — POSITIONER FOAM EGG CRATE ULNAR 2PCS (PINK)

## (undated) DEVICE — ELCTR STRYKER NEPTUNE SMOKE EVACUATION PENCIL (GREEN)

## (undated) DEVICE — SUT STRATAFIX SYMMETRIC PDS 1 45CM OS-6

## (undated) DEVICE — VENODYNE/SCD SLEEVE CALF MEDIUM

## (undated) DEVICE — PACK TOTAL KNEE